# Patient Record
Sex: MALE | Race: WHITE | NOT HISPANIC OR LATINO | URBAN - METROPOLITAN AREA
[De-identification: names, ages, dates, MRNs, and addresses within clinical notes are randomized per-mention and may not be internally consistent; named-entity substitution may affect disease eponyms.]

---

## 2023-05-02 ENCOUNTER — VIRTUAL VISIT (OUTPATIENT)
Dept: CONSULT | Facility: CLINIC | Age: 63
End: 2023-05-02
Attending: GENETIC COUNSELOR, MS
Payer: COMMERCIAL

## 2023-05-02 VITALS — WEIGHT: 210 LBS | BODY MASS INDEX: 29.4 KG/M2 | HEIGHT: 71 IN

## 2023-05-02 DIAGNOSIS — Z71.83 ENCOUNTER FOR NONPROCREATIVE GENETIC COUNSELING: ICD-10-CM

## 2023-05-02 DIAGNOSIS — Z84.89 FAMILY HISTORY OF GENETIC DISEASE: Primary | ICD-10-CM

## 2023-05-02 PROCEDURE — 96040 HC GENETIC COUNSELING, EACH 30 MINUTES: CPT | Mod: GT,95 | Performed by: GENETIC COUNSELOR, MS

## 2023-05-02 RX ORDER — SIMVASTATIN 20 MG
TABLET ORAL
COMMUNITY
Start: 2023-02-22 | End: 2024-02-22

## 2023-05-02 ASSESSMENT — PAIN SCALES - GENERAL: PAINLEVEL: NO PAIN (0)

## 2023-05-02 NOTE — NURSING NOTE
Is the patient currently in the state of [place]? YES    Visit mode:VIDEO    If the visit is dropped, the patient can be reconnected by: VIDEO VISIT: Text to cell phone: [phone]    Will anyone else be joining the visit? NO      How would you like to obtain your AVS? Mail a copy    Are changes needed to the allergy or medication list? YES: Pt didn't have med list with him.      Reason for visit: Video Visit    Date of pt reported vitals: estiamation  Pain: no  Danna Robles

## 2023-05-02 NOTE — PROGRESS NOTES
Virtual Visit Details    Type of service:  Video Visit     Originating Location (pt. Location): Home  Distant Location (provider location):  On-site  Platform used for Video Visit: Mario  GENETIC COUNSELING CONSULTATION NOTE    Date of visit: 05/02/23    Presenting Information:   Danis Marroquin is a 63 year old male referred to the [facility] due to a family history of [id number] occult macular dystrophy. He was seen for a genetic counseling appointment today to discuss genetic testing for the familial RP1L1 variant. He was accompanied by his wife Cherry who was also seen for a genetic counseling appointment with him today.     Danis reports that he has been overall pretty healthy with no particular vision concerns thus far.    Family History: A three generation pedigree was obtained and scanned into the electronic medical record. The relevant portions are described below:      Children-   ? Daughter, Lori, is 36 years old and has occult macular dystrophy and was found to have a pathogenic variant (mutation) in the RP1L1 gene called c.133C>T (p.[id number]). Lori's 5 year old daughter, Neema and her 1 year old son Vishnu both inherited the RP1L1 pathogenic variant as well. They have no vision concerns currently  ? Son, Willie, is 39 years old and has no reported vision concerns. He has not had any genetic testing yet. He has a 7 year old daughter and a 4 year old daughter (assigned male at birth) who are both healthy with no vision concerns.    Cherry's relatives:  ? Cherry has four sisters and two brothers. One sister has a history of a stroke like episode. Another sister has a heart condition, not specified. Her siblings are otherwise healthy with no vision concerns. No reported concerns for their children.   ? Cherry's mother needed reading glasses but had no specific ophthalmology diagnoses. She passed away at age 52 due to breast cancer that had metastasized to her liver.  She was adopted so no other information is known about her biological relatives.   ? Cherry's father had a history of cataracts and started wearing glasses in his 50's. He had prostate cancer and passed away due to old age at 93.    Danis's relatives:  ? Danis has two sisters and two brothers. One sister is alive and well as are her children and his other sister is healthy and has no children. One brother is color blind but has no other vision concerns and his children are healthy. The other brother is currently 59 years old and was told he should not be driving at night starting in his 50's. He has no children.   ? Danis's mother had a history of a stroke and passed away in her late 80's due to pneumonia. No known vision concerns.   ? Danis's father had a history of heart disease and passed away due to old age at age 91.   - One of his brother's has a son who has been legally blind since birth and cannot drive.      Family history is otherwise largely non-contributory. Cherry's ancestry is [place] and [place] and Danis's ancestry is [place] and [place]. Consanguinity was denied.     Genetic Counseling Discussion:  For review, our bodies are made of cells that contain our chromosomes which are made up of long stretches of DNA containing our genes. Our genes serve as the instructions for our bodies to grow and function. We have two copies of each gene, one inherited from our mother and one inherited from our father.     RP1L1 gene:  Pathogenic variant in the RP1L1 gene have been reported to cause autosomal dominant occult macular dystrophy and retinitis pigmentosa:  Occult macular dystrophy (OMD) affects the macula of the eye and causes a slowly progressive decrease of visual acuity. Individuals with OMD can have normal funduscopic appearance and normal full-field ERGs. Photophobia (light sensitivity) is another common symptom of OMD. In one study assessing 61 individuals with OI7V0-rlolrvp OMD, the average of onset of  "vision symptoms of OMD was 30 but symptom onset ranged from ages 3 to 60. This same study found that best visual acuity decreased steadily for 10-15 years from symptoms onset but then became stable.     UE6K2-tgebjut OMD is inherited in an autosomal dominant inheritance pattern. Autosomal dominant means an individual needs a single pathogenic/likely pathogenic variant  on one copy of the gene in order to be affected. Individuals who have a dominant condition have a 1 in 2 (50%) chance of passing their variant and the condition to each child.     Less commonly, pathogenic variants in the PR1L1 gene cause retinitis pigmentosa (RP) is a group of disorders characterized by abnormalities of the photoreceptors (rods and cones) of the eye that lead to progressive vision loss. The initial symptom in individuals with RP is defective dark adaptation, or \"night blindness,\" due to remigio dysfunction. This is followed by cone dysfunction and loss of peripheral vision. Central visual acuity is usually preserved until the end stages of RP. Individuals with RP can also have other eye findings such as cataracts, dust-like particles in the vitreous of the eye.     QY2P3-jkurjfb RP has been reported to be inherited in an autosomal recessive inheritance pattern. Autosomal recessive means an individual needs two likely pathogenic/pathogenic variants, one on each copy of the gene, in order to be affected with the condition. When an individual only has one variant in the gene, they are considered a carrier for the condition. When both parents are carriers for the same recessive condition, there is a 1 in 4 (25%) chance of having an affected child, a 1 in 2 (50%) chance of having a child who is an unaffected carrier, and a 1 in 4 (25%) chance of having a child who is neither affected nor a carrier with each pregnancy.     Genetic Testing:  Danis's daughter Lori had genetic testing which found a pathogenic variant (mutation) in the RP1L1 " gene called c.133C>T. Lori's eye findings on exam are also consistent with occult macular dystrophy. Therefore, this RP1L1 mutation explains her vision symptoms.      It is possible that this could be a brand new (de fransico) mutation in Lori, but it is more likely that this was inherited from one of her parents who may have an older age of onset than Lori. As previously mentioned, an individual with an autosomal dominant condition has a 50% chance of passing the condition to each child, so if Cherry or Danis do have this RP1L1 variant it would mean their son could also inherit this variant. Having this variant would also mean they are at risk for developing OMD in their lifetime.      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 A VUS means a change in the gene was found but there is not enough data or information yet to know if that change is harmful to the gene and causes a particular condition (pathogenic) or if is harmless (benign) change. Lori's testing was able to determine that this VUS is on the same copy of her RP1L1 gene as her pathogenic variant. Therefore, I would not expect this VUS to be contributing to her vision symptoms expecially because it occurs after the pathogenic variant in the gene's sequence or instructions. The cause of her vision symptoms is the pathogenic variant.    We discussed genetic testing today for the RP1L1 pathogenic variant and the VUS, which are both on one copy of the RP1L1 gene. This testing will only analyze the RP1L1 gene and will tell us if Danis has the pathogenic variant (and VUS) and is therefore at risk for developing OMD.     Danis consented to genetic testing today. The genetic testing lab that performed Lori's testing ([facility]) offers free family member testing for relatives within 150 days of her test report (roughly through August 2023). Danis  will be mailed a buccal sample collection kit. I will call with results about 2-3 weeks after the lab receives his sample.     It was a pleasure meeting Danis and his wife Cherry today. They were encouraged to reach out to me if they have any further questions.     Plan:  1. [facility] RP1L1 familial variant testing  2. Danis will be mailed a buccal kit for sample collection. I will call him with results about 2-3 weeks after the lab receives his sample      Lawanda Onofre MS, [facility]  Licensed Genetic Counselor   [facility]  Phone: [phone]  Fax: [phone]    Time spent in consultation face to face was approximately 20 minutes.    References:  Paloma N, Tsunoda K, Mizuno Y, Usui T, Hatase T, Ueno S, Kunistewartshi K, Lisseth T, Katagiri S, Kondo M, Kameya S, Yoshitanelly K, Fujinami K, Iwata T, Miyake Y. Clinical Stages of Occult Macular Dystrophy Based on Optical Coherence Tomographic Findings. Invest Ophthalmol Vis Sci. 2019 Nov 1;60(14):3610-9707. doi: 10.1167/iovs.19-18334. PMID: 25542336.    Brock JONES, Chidi IM. RP1L1 and inherited photoreceptor disease: A review. Surv Ophthalmol. 2020 Nov-Dec;65(6):725-739. doi: 10.1016/j.survophthal.2020.04.005. Epub 2020 Apr 30. PMID: 66143160.

## 2023-05-02 NOTE — LETTER
5/2/2023      RE: Danis Marroquin  [address] [place] [address]     Dear Colleague,    Thank you for the opportunity to participate in the care of your patient, Danis Marroquin, at the [facility] at [facility]. Please see a copy of my visit note below.    Virtual Visit Details    Type of service:  Video Visit     Originating Location (pt. Location): Home  Distant Location (provider location):  On-site  Platform used for Video Visit: Well  GENETIC COUNSELING CONSULTATION NOTE    Date of visit: 05/02/23    Presenting Information:   Danis Marroquin is a 63 year old male referred to the [facility] due to a family history of YH1V2-gjmkpio occult macular dystrophy. He was seen for a genetic counseling appointment today to discuss genetic testing for the familial RP1L1 variant. He was accompanied by his wife Cherry who was also seen for a genetic counseling appointment with him today.     Danis reports that he has been overall pretty healthy with no particular vision concerns thus far.    Family History: A three generation pedigree was obtained and scanned into the electronic medical record. The relevant portions are described below:      Children-   ? Daughter, Lori, is 36 years old and has occult macular dystrophy and was found to have a pathogenic variant (mutation) in the RP1L1 gene called c.133C>T (p.Klz98Vos). Lori's 5 year old daughter, Neema and her 1 year old son Vishnu both inherited the RP1L1 pathogenic variant as well. They have no vision concerns currently  ? Son, Willie, is 39 years old and has no reported vision concerns. He has not had any genetic testing yet. He has a 7 year old daughter and a 4 year old daughter (assigned male at birth) who are both healthy with no vision concerns.    Cherry's relatives:  ? Cherry has four sisters and two brothers. One sister has a history  of a stroke like episode. Another sister has a heart condition, not specified. Her siblings are otherwise healthy with no vision concerns. No reported concerns for their children.   ? Cherry's mother needed reading glasses but had no specific ophthalmology diagnoses. She passed away at age 52 due to breast cancer that had metastasized to her liver. She was adopted so no other information is known about her biological relatives.   ? Cherry's father had a history of cataracts and started wearing glasses in his 50's. He had prostate cancer and passed away due to old age at 93.    Danis's relatives:  ? Danis has two sisters and two brothers. One sister is alive and well as are her children and his other sister is healthy and has no children. One brother is color blind but has no other vision concerns and his children are healthy. The other brother is currently 59 years old and was told he should not be driving at night starting in his 50's. He has no children.   ? Danis's mother had a history of a stroke and passed away in her late 80's due to pneumonia. No known vision concerns.   ? Danis's father had a history of heart disease and passed away due to old age at age 91.   - One of his brother's has a son who has been legally blind since birth and cannot drive.      Family history is otherwise largely non-contributory. Cherry's ancestry is [place] and [place] and Danis's ancestry is [place] and [place]. Consanguinity was denied.     Genetic Counseling Discussion:  For review, our bodies are made of cells that contain our chromosomes which are made up of long stretches of DNA containing our genes. Our genes serve as the instructions for our bodies to grow and function. We have two copies of each gene, one inherited from our mother and one inherited from our father.     RP1L1 gene:  Pathogenic variant in the RP1L1 gene have been reported to cause autosomal dominant occult macular dystrophy and retinitis pigmentosa:  Occult  "macular dystrophy (OMD) affects the macula of the eye and causes a slowly progressive decrease of visual acuity. Individuals with OMD can have normal funduscopic appearance and normal full-field ERGs. Photophobia (light sensitivity) is another common symptom of OMD. In one study assessing 61 individuals with AY5C5-kmeupaq OMD, the average of onset of vision symptoms of OMD was 30 but symptom onset ranged from ages 3 to 60. This same study found that best visual acuity decreased steadily for 10-15 years from symptoms onset but then became stable.     TO6O0-pcqxcnd OMD is inherited in an autosomal dominant inheritance pattern. Autosomal dominant means an individual needs a single pathogenic/likely pathogenic variant  on one copy of the gene in order to be affected. Individuals who have a dominant condition have a 1 in 2 (50%) chance of passing their variant and the condition to each child.     Less commonly, pathogenic variants in the PR1L1 gene cause retinitis pigmentosa (RP) is a group of disorders characterized by abnormalities of the photoreceptors (rods and cones) of the eye that lead to progressive vision loss. The initial symptom in individuals with RP is defective dark adaptation, or \"night blindness,\" due to remigio dysfunction. This is followed by cone dysfunction and loss of peripheral vision. Central visual acuity is usually preserved until the end stages of RP. Individuals with RP can also have other eye findings such as cataracts, dust-like particles in the vitreous of the eye.     BX3W5-sesbqou RP has been reported to be inherited in an autosomal recessive inheritance pattern. Autosomal recessive means an individual needs two likely pathogenic/pathogenic variants, one on each copy of the gene, in order to be affected with the condition. When an individual only has one variant in the gene, they are considered a carrier for the condition. When both parents are carriers for the same recessive condition, there " is a 1 in 4 (25%) chance of having an affected child, a 1 in 2 (50%) chance of having a child who is an unaffected carrier, and a 1 in 4 (25%) chance of having a child who is neither affected nor a carrier with each pregnancy.     Genetic Testing:  Danis's daughter Lori had genetic testing which found a pathogenic variant (mutation) in the RP1L1 gene called c.133C>T. Lori's eye findings on exam are also consistent with occult macular dystrophy. Therefore, this RP1L1 mutation explains her vision symptoms.      It is possible that this could be a brand new (de fransico) mutation in Lori, but it is more likely that this was inherited from one of her parents who may have an older age of onset than Lori. As previously mentioned, an individual with an autosomal dominant condition has a 50% chance of passing the condition to each child, so if Cherry or Danis do have this RP1L1 variant it would mean their son could also inherit this variant. Having this variant would also mean they are at risk for developing OMD in their lifetime.      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 A VUS means a change in the gene was found but there is not enough data or information yet to know if that change is harmful to the gene and causes a particular condition (pathogenic) or if is harmless (benign) change. Lori's testing was able to determine that this VUS is on the same copy of her RP1L1 gene as her pathogenic variant. Therefore, I would not expect this VUS to be contributing to her vision symptoms expecially because it occurs after the pathogenic variant in the gene's sequence or instructions. The cause of her vision symptoms is the pathogenic variant.    We discussed genetic testing today for the RP1L1 pathogenic variant and the VUS, which are both on one copy of the RP1L1 gene. This testing will only analyze the RP1L1 gene and  will tell us if Danis has the pathogenic variant (and VUS) and is therefore at risk for developing OMD.     Danis consented to genetic testing today. The genetic testing lab that performed Lori's testing ([facility]) offers free family member testing for relatives within 150 days of her test report (roughly through August 2023). Danis will be mailed a buccal sample collection kit. I will call with results about 2-3 weeks after the lab receives his sample.     It was a pleasure meeting Danis and his wife Cherry today. They were encouraged to reach out to me if they have any further questions.     Plan:  1. [facility] RP1L1 familial variant testing  2. Danis will be mailed a buccal kit for sample collection. I will call him with results about 2-3 weeks after the lab receives his sample      Lawanda Onofre MS, [facility]  Licensed Genetic Counselor   [facility]  Phone: [phone]  Fax: [phone]    Time spent in consultation face to face was approximately 20 minutes.    References:  Paloma N, Tsunoda K, Mizuno Y, Usui T, Hatase T, Ueno S, Kunidurgai K, Lisseth T, Katagiri S, Kondo M, Kameya S, Yoshitake K, Fujinami K, Iwata T, Miyake Y. Clinical Stages of Occult Macular Dystrophy Based on Optical Coherence Tomographic Findings. Invest Ophthalmol Vis Sci. 2019 Nov 1;60(14):7290-8954. doi: 10.1167/iovs.19-33686. PMID: 61993648.    Brock JONES, Chidi LESLIE. RP1L1 and inherited photoreceptor disease: A review. Surv Ophthalmol. 2020 Nov-Dec;65(6):725-739. doi: 10.1016/j.survophthal.2020.04.005. Epub 2020 Apr 30. PMID: 25534368.      Please do not hesitate to contact me if you have any questions/concerns.     Sincerely,       Carito Onofre,

## 2023-05-25 ENCOUNTER — TELEPHONE (OUTPATIENT)
Dept: CONSULT | Facility: CLINIC | Age: 63
End: 2023-05-25
Payer: COMMERCIAL

## 2023-05-25 NOTE — TELEPHONE ENCOUNTER
I called Danis and left him a voicemail asking him to call me back to review the genetic test results.     Lawanda Onofre MS, [facility]  Licensed Genetic Counselor  [facility]  Phone: [phone]  Fax: [phone]

## 2023-05-25 NOTE — TELEPHONE ENCOUNTER
I spoke with Danis and we reviewed the results of his genetic testing. The results of Danis's testing are POSITIVE. 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 This result means Danis is at increased risk for developing occult macular dystrophy in his lifetime.        These results will be summarized in a letter for Danis and mailed to him along with a copy of his test report. We discussed a referral to our retinal specialist who also saw Danis's daughter but he declined at this time because he has no current vision concerns.     Danis can reach out to me anytime with additional genetics questions/needs.     Lawanda Onofre MS, [facility]  Licensed Genetic Counselor  [facility]  Phone: [phone]  Fax: [phone]

## 2024-02-06 DIAGNOSIS — Z84.89 FAMILY HISTORY OF GENETIC DISEASE: Primary | ICD-10-CM

## 2024-02-22 ENCOUNTER — TELEPHONE (OUTPATIENT)
Dept: OPHTHALMOLOGY | Facility: CLINIC | Age: 64
End: 2024-02-22

## 2024-02-22 ENCOUNTER — OFFICE VISIT (OUTPATIENT)
Dept: OPHTHALMOLOGY | Facility: CLINIC | Age: 64
End: 2024-02-22
Attending: OPHTHALMOLOGY
Payer: COMMERCIAL

## 2024-02-22 DIAGNOSIS — H35.54 OCCULT MACULAR DYSTROPHY: Primary | ICD-10-CM

## 2024-02-22 DIAGNOSIS — Z84.89 FAMILY HISTORY OF GENETIC DISEASE: ICD-10-CM

## 2024-02-22 PROCEDURE — 92134 CPTRZ OPH DX IMG PST SGM RTA: CPT | Performed by: OPHTHALMOLOGY

## 2024-02-22 PROCEDURE — 92250 FUNDUS PHOTOGRAPHY W/I&R: CPT | Performed by: OPHTHALMOLOGY

## 2024-02-22 PROCEDURE — 99203 OFFICE O/P NEW LOW 30 MIN: CPT | Mod: GC | Performed by: OPHTHALMOLOGY

## 2024-02-22 PROCEDURE — 99214 OFFICE O/P EST MOD 30 MIN: CPT | Performed by: OPHTHALMOLOGY

## 2024-02-22 PROCEDURE — 92015 DETERMINE REFRACTIVE STATE: CPT

## 2024-02-22 PROCEDURE — 99207 FUNDUS AUTOFLUORESCENCE IMAGE (FAF) OU (BOTH EYES): CPT | Mod: 26 | Performed by: OPHTHALMOLOGY

## 2024-02-22 RX ORDER — CHLORAL HYDRATE 500 MG
1000 CAPSULE ORAL
COMMUNITY
Start: 2022-05-16

## 2024-02-22 RX ORDER — LISINOPRIL 40 MG/1
1 TABLET ORAL DAILY
COMMUNITY
Start: 2020-01-01

## 2024-02-22 ASSESSMENT — REFRACTION_MANIFEST
OD_SPHERE: +1.00
OS_SPHERE: +0.75
OS_CYLINDER: +1.25
OS_ADD: +2.50
OD_CYLINDER: +0.50
OD_ADD: +2.50
OS_AXIS: 021
OD_AXIS: 007

## 2024-02-22 ASSESSMENT — CUP TO DISC RATIO
OS_RATIO: 0.3
OD_RATIO: 0.3

## 2024-02-22 ASSESSMENT — CONF VISUAL FIELD
OD_NORMAL: 1
OD_INFERIOR_NASAL_RESTRICTION: 0
OS_INFERIOR_TEMPORAL_RESTRICTION: 0
OD_SUPERIOR_TEMPORAL_RESTRICTION: 0
OS_SUPERIOR_NASAL_RESTRICTION: 0
OD_SUPERIOR_NASAL_RESTRICTION: 0
METHOD: COUNTING FINGERS
OS_SUPERIOR_TEMPORAL_RESTRICTION: 0
OS_INFERIOR_NASAL_RESTRICTION: 0
OS_NORMAL: 1
OD_INFERIOR_TEMPORAL_RESTRICTION: 0

## 2024-02-22 ASSESSMENT — TONOMETRY
IOP_METHOD: TONOPEN
OD_IOP_MMHG: 17
OS_IOP_MMHG: 14

## 2024-02-22 ASSESSMENT — VISUAL ACUITY
OD_CC: 20/25
METHOD: SNELLEN - LINEAR
OD_CC+: -1
OS_CC: 20/25
CORRECTION_TYPE: GLASSES
OS_CC+: -2

## 2024-02-22 ASSESSMENT — REFRACTION_WEARINGRX
OD_ADD: +2.50
OS_ADD: +2.50
OS_AXIS: 028
OD_SPHERE: +1.25
OD_CYLINDER: +0.50
OD_AXIS: 007
OS_CYLINDER: +1.25
OS_SPHERE: +0.75
SPECS_TYPE: PAL

## 2024-02-22 ASSESSMENT — SLIT LAMP EXAM - LIDS
COMMENTS: NORMAL
COMMENTS: NORMAL

## 2024-02-22 ASSESSMENT — EXTERNAL EXAM - LEFT EYE: OS_EXAM: NORMAL

## 2024-02-22 ASSESSMENT — EXTERNAL EXAM - RIGHT EYE: OD_EXAM: NORMAL

## 2024-02-22 NOTE — PROGRESS NOTES
CC: Blurry vision  First appointment with me  Referred by: Self referred  HPI: Danis Marroquin is a 64 year old year-old patient without significant past ocular history. States that his daughter was diagnosed with OMD around 1 year prior for which he decided to get tested and made an appointment with our retinal clinic for further evaluation and management.     Retinal Dystrophy assessment:  Nyctalopia: No   Problems going from outside bright light to inside: Yes   Problems going from inside to bright light outside: Yes  Photosensitivity: Yes   Problems with steps, curbs, or stairs: No  Problems with color vision:  No  Sees flashing lights: No    PMH: Decreased hearing around 5 years prior  HTN currently on lisinopril   High cholesterol currently on simvastatin    Past ocular history:No POHx    FH: Daughter, son, niece with OMD. possibly nephew, two paternal cousins; patient has 4 siblings    Retinal Imaging:    Optos photos  Right eye: Clear media, optic disc is flat without edema or atrophy. C/D ratio 0.3. Macula is flat with good foveal reflex. Vessels are normal. Periphery without tears or detachment  Left eye: Clear media, optic disc is flat without edema or atrophy. C/D ratio 0.3. Macula is flat with good foveal reflex. Vessels are normal. Periphery without tears or detachment    FAF  Right eye: No areas of hyper or hypoFAF  Left eye: No areas of hyper or hypoFAF    OCT   RE:  Epiretinal membrane. Mild stippling of the Ellipsoid Zone  Outside the fovea. No intraretinal fluid, small subfoveal pigment epithelial detachment.PHF detached  LE: Normal foveal contour, trace Epiretinal membrane, Mild stippling of the Ellipsoid Zone  Outside the fovea. no fluid, PHF detached    Assessment & Plan:  # OMD both eyes    - [facility] testing done on 24/05/2023  - VA: 20/25 both eyes. Color vision: Full both eyes   - patient asymptomatic. 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 Same day anshu with me. In the next 6-8 months       Cecil Walker MD  Retina fellow  ~~~~~~~~~~~~~~~~~~~~~~~~~~~~~~~~~~   Complete documentation of historical and exam elements from today's encounter can be found in the full encounter summary report (not reduplicated in this progress note).  I personally obtained the chief complaint(s) and history of present illness.  I confirmed and edited as necessary the review of systems, past medical/surgical history, family history, social history, and examination findings as documented by others; and I examined the patient myself.  I personally reviewed the relevant tests, images, and reports as documented above.  I personally reviewed the ophthalmic test(s) associated with this encounter, agree with the interpretation(s) as documented by the resident/fellow, and have edited the corresponding report(s) as necessary.   I formulated and edited as necessary the assessment and plan and discussed the findings and management plan with the patient and family    Christine Sarabia MD  Professor of Ophthalmology.   Vitreo-retinal surgeon   Department of Ophthalmology and Visual Neurosciences   [facility]  Phone: [phone]   Fax: [phone]

## 2024-02-22 NOTE — TELEPHONE ENCOUNTER
02-22-24  Per ERG Referral,  w/Dr. Sarabia 02-22 and requesting FM-100+ffERG+mfERG within 1 month then return 1-mon (~Mar 27/28).  Pt's voicemail box is not set up and also pt not able to hear me speaking.  [facility] w/wife (Cherry), #[phone].  Informed expected duration for testing ~5 hours and eyes will be dilated.  First available dates for testing Mar 21/Mar 28/Apr 4 then rtn to Dr. Sarabia to follow.  Given my direct# [phone] and to [facility] (TT out 02-23).    Pt's wife called back and spoke to pt on her phone.  Pt has hearing aids and it probably interfered with his phone.  Agreed on date and time for testing and return to Dr. Sarabia.      TT will route to  to schedule:     Date and time = Thur Mar 28 at  9:00  Eye, Electrodiagnostic == ffERG  Attending = No  Referring = No  Appt Notes = OMD//(FM-100+ffERG, TT to do) 1st then mfERG (BD to do) 2nd  Duration = 3.5 hours  Message = chart at  for BD/TT (2 appts)    Date and time = Thur Mar 28 at  1:00  Eye, Electrodiagnostic == mfERG  Attending = No  Referring = No  Appt Notes = OMD//(FM-100+ffERG, TT to do) 1st then mfERG (BD to do) 2nd  Duration = 2.0 hours  Message = chart at  for BD/TT (2 appts)    Date and time = Thur Apr 11 at 9:20  Retina, RETURN  Attending = No  Referring = No  Appt Notes = 1-month, OMD//FM-100+ffERG+mfERG sched 03-28    Please send info packet w/appt reminder, map/directions and handouts.    Please place a hold on the 8:00 and the 1:00 ffERG slots.  Please place a hold on the 12:00 mfERG slot.

## 2024-02-22 NOTE — NURSING NOTE
Chief Complaints and History of Present Illnesses   Patient presents with    Retinal Evaluation     OMD, had genetic testing      Chief Complaint(s) and History of Present Illness(es)       Retinal Evaluation              Comments: OMD, had genetic testing               Comments    Pt states some trouble with distance vision   Pt requested new RX for glasses   No flashes or floaters  No eye pain or tearing     Cindy Green COT 10:29 AM February 22, 2024

## 2024-03-28 ENCOUNTER — ALLIED HEALTH/NURSE VISIT (OUTPATIENT)
Dept: OPHTHALMOLOGY | Facility: CLINIC | Age: 64
End: 2024-03-28
Attending: OPHTHALMOLOGY
Payer: COMMERCIAL

## 2024-03-28 DIAGNOSIS — H35.54 OCCULT MACULAR DYSTROPHY: ICD-10-CM

## 2024-03-28 PROCEDURE — 92274 MULTIFOCAL ERG W/I&R: CPT

## 2024-03-28 PROCEDURE — 92273 FULL FIELD ERG W/I&R: CPT

## 2024-03-28 PROCEDURE — 92283 EXTND COLOR VISION XM: CPT

## 2024-03-28 PROCEDURE — 92283 EXTND COLOR VISION XM: CPT | Mod: 26 | Performed by: OPHTHALMOLOGY

## 2024-03-28 PROCEDURE — 92274 MULTIFOCAL ERG W/I&R: CPT | Mod: 26 | Performed by: OPHTHALMOLOGY

## 2024-03-28 PROCEDURE — 999N000103 HC STATISTIC NO CHARGE FACILITY FEE

## 2024-03-28 PROCEDURE — 92273 FULL FIELD ERG W/I&R: CPT | Mod: 26 | Performed by: OPHTHALMOLOGY

## 2024-03-28 ASSESSMENT — VISUAL ACUITY
OD_CC+: -2
METHOD: SNELLEN - LINEAR
OD_CC: 20/20
OS_CC: 20/20

## 2024-03-28 ASSESSMENT — REFRACTION_WEARINGRX
OD_ADD: +2.50
OS_AXIS: 028
OS_ADD: +2.50
OD_SPHERE: +1.25
SPECS_TYPE: PAL
OS_CYLINDER: +1.25
OD_CYLINDER: +0.50
OS_SPHERE: +0.75
OD_AXIS: 007

## 2024-03-28 NOTE — NURSING NOTE
Returns for FM-100+ffERG+mfERG.  Last eye exam w/Dr. Sarabia 02-22-24:  +FHx OMD.  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  No interval changes.  Scheduled to return to Dr. Sarabia 04-11; will await results.

## 2024-03-28 NOTE — LETTER
3/28/2024    STANDARD FM-100+ffERG+mfERG REPORT    Testing Date:  3/28/2024    REFERRING:  Christine Sarabia MD    RE: Danis Marroquin  MRN: [id number]  : 1960                 CLINICAL HISTORY:  Returns for FM-100+ffERG+mfERG.  Last eye exam with Dr. Sarabia 24:  +FHx OMD.  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  No interval changes.  Scheduled to return to Dr. Sarabia ; will await results.    Visual Acuity: Right Eye: 20/20-2      W/gls, +1.25 +0.50 x 007      Left Eye: 20/20      W/gls, +0.75 +1.25 x 028    IMPRESSION FM-100: PROBABLY NORMAL both eyes   STANDARD FM-100 REPORT:   DATA FOR FM-100 Right  Eye Left Eye   Total Error Score* 166, probably normal 128, probably normal   Color Discrimination+ Probably normal Probably normal                *Automatic Evaluation - Classical Method - Individual trays are scored independently.          Martha GOLD, Candace PARSONS.  [facility] [place] [facility] hue test norms of normal observers           or each year of age 5-22 and for decades 30-70.  Br J Ophthalmol. 8:[id number] (2002)          +Automatic Evaluation according to Sania Acevedo M.D., Ph.D.           =Automatic Evaluation - Moment of Inertia Method.          FABRICE Macedo. and Walter PKAT. A quantitative scoring technique for panel tests of color vision.            Investigative Ophthalmology and Visual Science, 1988, 29:50-63.      MULTIFOCAL ERG REPORT:  Abnormal central amplitude responses in both eyes     Tech notes: mfERG (9min 60 Hz) discussed and performed with Veris system and 0.5%CBM.  Equally well dilated 9mm. Used large adult ctl w loose fit.Taped ctl electrode upright BE. Visualized default fixation cross BE.  Nice breathing/blinking. Slight head shaking during testing. Toggled between fundus and external camera and  observed good fixation.     DIAGNOSTIC FINDINGS:  This is a reliable and well-performed multifocal electroretinogram both eyes.  A 103-hexagon stimulus pattern multifocal ERG was done in both eyes.  The multifocal ERG was abnormal in both eyes using the Veris system. In particular, in both eyes the waveform tracings showed normal morphology and good signal-to-noise ratio. The amplitude plot showed mildly decreased  foveal peak.  The multifocal electroretinogram shows mild depression of the central waveforms of both eyes. The implicit times were normal.      STANDARD ffERG REPORT    IMPRESSION ffERG:  Likely normal electroretinogram (ERG)                     ALL AVERAGED                   Data for Full-Field ERG  Right Eye  Left Eye   DARK-ADAPTED Patient Normal Patient   0.01 ERG (remigio) b-wave amplitude ( v) 174 69.6 to 348.2 182   0.01 ERG (remigio) b-wave implicit time (ms) 99.8 78.2 to 117.2 89.3         3.0 ERG (combined) a-wave amplitude ( v) -167 -259.4 to -98 -176   3.0 ERG (combined) a-wave implicit time (ms) 17.8 11.5 to 20.3 16.6   3.0 ERG (combined) b-wave amplitude ( v) 301 159.2 to 421.6 303   3.0 ERG (combined) b-wave implicit time (ms) 51.6 41.2 to 57.2 56.6         10.0 ERG (brighter combined)a-wave amplitude ( v) -215 -291 to -110 -208   10.0 ERG (brighter combined)a-wave implicit time (ms) 15 9.9 to 15.1 13.3   10.0 ERG (brighter combined)b-wave amplitude ( v) 318 191.5 to 403.1 307   10.0 ERG (brighter combined)b-wave implicit time (ms) 55.5 39.1 to 55.3 57.7         3.0 Oscillatory Potentials  Present     LIGHT-ADAPTED       3.0 Flicker (30Hz) amplitude ( v) 70 56.4 to 151.2 82   3.0 Flicker (30Hz) implicit time (ms) 27.2 21.8 to 31.6 26.6         3.0 ERG (cone) a-wave amplitude ( v) -31 -45.1 to -13.7 -27   3.0 ERG (cone) a-wave implicit time (ms) 16.1 11.4 to 16.8 15   3.0 ERG (cone) b-wave amplitude ( v) 105 62.4 to 174.2 98   3.0 ERG (cone) b-wave implicit time (ms) 30 26.8 to 34.2 29.4   * =  manipulated cursors  parentheses = cursors at selected peaks  ---- = residual to non-measurable  xxxx = not tested      Tech notes: ffERG discussed and performed with E3 system.  Instructed on importance of maintaining fixation and relaxation for optimum recordings.  FM-100 done just prior, both eyes undilated.  Equally well-dilated ~9mm.  Tolerated dtl nicely; long lashes upper and lower.  Equal and adequate eye opening w/no effort needed to clear dilated pupils.  Impedances low and comparable throughout.  No difficulty w/blinking.  Specifically, no eyelid flutter to bright flashes DA+LA and only slight droop noted with flicker.  Never obscuring dilated pupil or with complete closure.    ffERG INTERPRETATION:  This electroretinogram was performed according to ISCEV standards using the [employer] E3 system and DTL fiber-recording electrodes. The patient tolerated the testing well. The waveforms are fairly reproducible and well formed. The responses in between both eyes were similar. The normative values provided above represent the 95% confidence limits for a normal individual the age of the patient. The patient s responses are averaged.    In scotopic conditions, the remigio-specific responses were normal in both eyes. The maximal response, a combined remigio and cone response, was essentially normal and the implicit time was normal in both eyes. The implicit time was delayed for the b-wave brighter combined responses.    In light-adapted conditions, the 30-Hz flicker response has a normal amplitude and normal implicit time in both eyes. The single photopic flash responses are normal.    CONCLUSION: This represents a likely normal electroretinogram. There is no evidence of significant remigio or cone photoreceptor abnormalities in either eye. There is delay in the implicit time in the b-wave brighter combined response of unclear significance. This is a nonspecific finding. Certainly, there is no significant loss of remigio or cone  photoreceptors. Given that the mfERG showed  depression of the central waveforms of both eyes, this is most consistent with mild maculopathy. Clinical correlation is recommended.       I would recommend a repeat electroretinogram in a couple of years if there continues to be concern regarding a possible retinal dystrophy.     Thank you for the opportunity to provide electrophysiologic services for this patient.      Christine Sarabia MD  Professor of ophthalmology  Retina Service   Department of Ophthalmology and Visual Neurosciences   [facility]  Phone: [phone]   Fax: [phone]

## 2024-03-28 NOTE — NURSING NOTE
Chief Complaints and History of Present Illnesses   Patient presents with    procedure August BUCHANAN 2:43 PM March 28, 2024        Chief Complaint(s) and History of Present Illness(es)       procedure August              Comments: Anisha BUCHANAN 2:43 PM March 28, 2024

## 2024-04-11 ENCOUNTER — OFFICE VISIT (OUTPATIENT)
Dept: OPHTHALMOLOGY | Facility: CLINIC | Age: 64
End: 2024-04-11
Attending: OPHTHALMOLOGY
Payer: COMMERCIAL

## 2024-04-11 DIAGNOSIS — H35.54 OCCULT MACULAR DYSTROPHY: Primary | ICD-10-CM

## 2024-04-11 PROCEDURE — 99211 OFF/OP EST MAY X REQ PHY/QHP: CPT | Performed by: OPHTHALMOLOGY

## 2024-04-11 PROCEDURE — 92014 COMPRE OPH EXAM EST PT 1/>: CPT | Performed by: OPHTHALMOLOGY

## 2024-04-11 ASSESSMENT — CONF VISUAL FIELD
OD_INFERIOR_TEMPORAL_RESTRICTION: 0
OD_SUPERIOR_TEMPORAL_RESTRICTION: 0
OS_NORMAL: 1
OD_NORMAL: 1
OS_SUPERIOR_TEMPORAL_RESTRICTION: 0
OS_SUPERIOR_NASAL_RESTRICTION: 0
OD_SUPERIOR_NASAL_RESTRICTION: 0
OS_INFERIOR_NASAL_RESTRICTION: 0
OS_INFERIOR_TEMPORAL_RESTRICTION: 0
OD_INFERIOR_NASAL_RESTRICTION: 0
METHOD: COUNTING FINGERS

## 2024-04-11 ASSESSMENT — VISUAL ACUITY
OD_CC: 20/25
OS_CC: 20/25
OD_CC+: +1
OS_CC+: -2
METHOD: SNELLEN - LINEAR
CORRECTION_TYPE: GLASSES

## 2024-04-11 ASSESSMENT — CUP TO DISC RATIO
OS_RATIO: 0.3
OD_RATIO: 0.3

## 2024-04-11 ASSESSMENT — TONOMETRY
OS_IOP_MMHG: 20
OD_IOP_MMHG: 18
IOP_METHOD: TONOPEN

## 2024-04-11 ASSESSMENT — EXTERNAL EXAM - LEFT EYE: OS_EXAM: NORMAL

## 2024-04-11 ASSESSMENT — REFRACTION_WEARINGRX
OS_ADD: +2.50
OD_AXIS: 007
OS_AXIS: 028
OS_SPHERE: +0.75
SPECS_TYPE: PAL
OD_ADD: +2.50
OS_CYLINDER: +1.25
OD_CYLINDER: +0.50
OD_SPHERE: +1.25

## 2024-04-11 ASSESSMENT — EXTERNAL EXAM - RIGHT EYE: OD_EXAM: NORMAL

## 2024-04-11 ASSESSMENT — SLIT LAMP EXAM - LIDS
COMMENTS: NORMAL
COMMENTS: NORMAL

## 2024-04-11 NOTE — PROGRESS NOTES
CC: follow up OMD  Follow up with testing    HPI: Danis Marroquin is a 64 year old year-old patient without significant past ocular history. States that his daughter was diagnosed with OMD around 1 year prior for which he decided to get tested and made an appointment with our retinal clinic for further evaluation and management.     Retinal Dystrophy assessment:  Nyctalopia: No   Problems going from outside bright light to inside: Yes   Problems going from inside to bright light outside: Yes  Photosensitivity: Yes   Problems with steps, curbs, or stairs: No  Problems with color vision:  No  Sees flashing lights: No    PMH: Decreased hearing around 5 years prior  HTN currently on lisinopril   High cholesterol currently on simvastatin    Past ocular history:No POHx    FH: Daughter, son, niece with OMD. possibly nephew, two paternal cousins; patient has 4 siblings    Retinal Imaging:    Optos photos  Right eye: Clear media, optic disc is flat without edema or atrophy. C/D ratio 0.3. Macula is flat with good foveal reflex. Vessels are normal. Periphery without tears or detachment  Left eye: Clear media, optic disc is flat without edema or atrophy. C/D ratio 0.3. Macula is flat with good foveal reflex. Vessels are normal. Periphery without tears or detachment    FAF  Right eye: No areas of hyper or hypoFAF  Left eye: No areas of hyper or hypoFAF    OCT 2.2024  RE:  Epiretinal membrane. Mild stippling of the Ellipsoid Zone  Outside the fovea. No intraretinal fluid, small subfoveal pigment epithelial detachment.PHF detached  LE: Normal foveal contour, trace Epiretinal membrane, Mild stippling of the Ellipsoid Zone  Outside the fovea. no fluid, PHF detached    Assessment & Plan:    # OMD both eyes    - [facility] testing done on 24/05/2023  - VA: 20/25 both eyes. Color vision: Full both eyes   - patient asymptomatic. 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  I personally obtained the chief complaint(s) and history of present illness.  I confirmed and edited as necessary the review of systems, past medical/surgical history, family history, social history, and examination findings as documented by others; and I examined the patient myself.  I personally reviewed the relevant tests, images, and reports as documented above.  I formulated and edited as necessary the assessment and plan and discussed the findings and management plan with the patient and family    Christine Sarabia MD  Professor of Ophthalmology.   Vitreo-retinal surgeon   Department of Ophthalmology and Visual Neurosciences   [facility]  Phone: [phone]   Fax: [phone]

## 2024-04-11 NOTE — NURSING NOTE
Chief Complaints and History of Present Illnesses   Patient presents with    Retinal Evaluation     Chief Complaint(s) and History of Present Illness(es)       Retinal Evaluation              Laterality: both eyes    Onset: months ago    Quality: .mmva    Associated symptoms: Negative for photophobia, flashes and floaters    Treatments tried: no treatments    Pain scale: 0/10              Comments    Here for results of testing done in March.  Elke De León COT 9:11 AM April 11, 2024

## 2025-04-16 ENCOUNTER — OFFICE VISIT (OUTPATIENT)
Dept: OPHTHALMOLOGY | Facility: CLINIC | Age: 65
End: 2025-04-16
Attending: OPHTHALMOLOGY
Payer: COMMERCIAL

## 2025-04-16 DIAGNOSIS — H35.54 OCCULT MACULAR DYSTROPHY: ICD-10-CM

## 2025-04-16 PROCEDURE — 92015 DETERMINE REFRACTIVE STATE: CPT

## 2025-04-16 PROCEDURE — 92134 CPTRZ OPH DX IMG PST SGM RTA: CPT | Performed by: OPHTHALMOLOGY

## 2025-04-16 PROCEDURE — G0463 HOSPITAL OUTPT CLINIC VISIT: HCPCS | Performed by: OPHTHALMOLOGY

## 2025-04-16 ASSESSMENT — REFRACTION_MANIFEST
OD_CYLINDER: +1.00
OD_ADD: +2.50
OD_AXIS: 005
OD_SPHERE: +0.50
OS_AXIS: 015
OS_ADD: +2.50
OS_CYLINDER: +1.00
OS_SPHERE: +0.50

## 2025-04-16 ASSESSMENT — REFRACTION_WEARINGRX
OS_CYLINDER: +1.25
OD_CYLINDER: +0.50
OD_ADD: +2.50
OS_ADD: +2.50
OD_AXIS: 007
OD_SPHERE: +1.00
OS_AXIS: 021
OS_SPHERE: +0.75

## 2025-04-16 ASSESSMENT — SLIT LAMP EXAM - LIDS
COMMENTS: NORMAL
COMMENTS: NORMAL

## 2025-04-16 ASSESSMENT — VISUAL ACUITY
OD_CC+: +2
OS_CC+: -2
METHOD: SNELLEN - LINEAR
OD_CC: 20/25
OS_CC: 20/20
CORRECTION_TYPE: GLASSES

## 2025-04-16 ASSESSMENT — CONF VISUAL FIELD
METHOD: COUNTING FINGERS
OS_INFERIOR_NASAL_RESTRICTION: 0
OD_SUPERIOR_TEMPORAL_RESTRICTION: 0
OD_NORMAL: 1
OS_SUPERIOR_NASAL_RESTRICTION: 0
OS_NORMAL: 1
OD_INFERIOR_NASAL_RESTRICTION: 0
OD_SUPERIOR_NASAL_RESTRICTION: 0
OD_INFERIOR_TEMPORAL_RESTRICTION: 0
OS_SUPERIOR_TEMPORAL_RESTRICTION: 0
OS_INFERIOR_TEMPORAL_RESTRICTION: 0

## 2025-04-16 ASSESSMENT — TONOMETRY
IOP_METHOD: TONOPEN
OD_IOP_MMHG: 17
OS_IOP_MMHG: 18

## 2025-04-16 ASSESSMENT — EXTERNAL EXAM - LEFT EYE: OS_EXAM: NORMAL

## 2025-04-16 ASSESSMENT — EXTERNAL EXAM - RIGHT EYE: OD_EXAM: NORMAL

## 2025-04-16 ASSESSMENT — CUP TO DISC RATIO
OS_RATIO: 0.3
OD_RATIO: 0.3

## 2025-04-16 NOTE — NURSING NOTE
Chief Complaints and History of Present Illnesses   Patient presents with    Follow Up     Occult macular dystrophy     Chief Complaint(s) and History of Present Illness(es)       Follow Up              Comments: Occult macular dystrophy              Comments    Pt request MR today for new glasses   Pt states no change in VA since last visit  No flashes or floaters  No eye pain or redness    Cindy Green COT 8:24 AM April 16, 2025

## 2025-04-16 NOTE — PROGRESS NOTES
CC: follow up OMD    Interval: Patient reports that vision has remained stable. Glasses prescription as changed a little.     HPI: Danis Marroquin is a 65 year old year-old patient without significant past ocular history. States that his daughter was diagnosed with OMD around 1 year prior for which he decided to get tested and made an appointment with our retinal clinic for further evaluation and management.     Retinal Dystrophy assessment:  Nyctalopia: No   Problems going from outside bright light to inside: Yes   Problems going from inside to bright light outside: Yes  Photosensitivity: Yes   Problems with steps, curbs, or stairs: No  Problems with color vision:  No  Sees flashing lights: No    PMH: Decreased hearing around 5 years prior  HTN currently on lisinopril   High cholesterol currently on simvastatin    Past ocular history:No POHx    FH: Daughter, son, niece with OMD. possibly nephew, two paternal cousins; patient has 4 siblings    Retinal Imaging:    Optos photos 2/22/2024  Right eye: Clear media, optic disc is flat without edema or atrophy. C/D ratio 0.3. Macula is flat with good foveal reflex. Vessels are normal. Periphery without tears or detachment  Left eye: Clear media, optic disc is flat without edema or atrophy. C/D ratio 0.3. Macula is flat with good foveal reflex. Vessels are normal. Periphery without tears or detachment    FAF 2/22/2024  Right eye: No areas of hyper or hypoFAF  Left eye: No areas of hyper or hypoFAF    OCT 04/16/25   RE:  Epiretinal membrane. Mild stippling of the Ellipsoid Zone  Outside the fovea. No intraretinal fluid, small subfoveal pigment epithelial detachment.PHF detached- stable  LE: Normal foveal contour, trace Epiretinal membrane, Mild stippling of the Ellipsoid Zone  Outside the fovea. no fluid, PHF detached - stable    Color vision: 10.56/11    Assessment & Plan:    # OMD both eyes    - [facility] testing done on 24/05/2023  - VA: 20/25 both eyes. Color vision: Full  both eyes   - patient asymptomatic. 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  I personally obtained the chief complaint(s) and history of present illness.  I confirmed and edited as necessary the review of systems, past medical/surgical history, family history, social history, and examination findings as documented by others; and I examined the patient myself.  I personally reviewed the relevant tests, images, and reports as documented above.  I personally reviewed the ophthalmic test(s) associated with this encounter, agree with the interpretation(s) as documented by the resident/fellow, and have edited the corresponding report(s) as necessary.   I formulated and edited as necessary the assessment and plan and discussed the findings and management plan with the patient and family    Christine Sarabia MD  Professor of Ophthalmology.   Vitreo-retinal surgeon   Department of Ophthalmology and Visual Neurosciences   [facility]  Phone: [phone]   Fax: [phone]

## 2025-08-02 ENCOUNTER — HEALTH MAINTENANCE LETTER (OUTPATIENT)
Age: 65
End: 2025-08-02